# Patient Record
Sex: FEMALE | Race: ASIAN | NOT HISPANIC OR LATINO
[De-identification: names, ages, dates, MRNs, and addresses within clinical notes are randomized per-mention and may not be internally consistent; named-entity substitution may affect disease eponyms.]

---
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
## 2020-10-22 ENCOUNTER — APPOINTMENT (OUTPATIENT)
Dept: DERMATOLOGY | Facility: CLINIC | Age: 20
End: 2020-10-22

## 2021-06-25 ENCOUNTER — LABORATORY RESULT (OUTPATIENT)
Age: 21
End: 2021-06-25

## 2021-06-25 ENCOUNTER — APPOINTMENT (OUTPATIENT)
Dept: CARDIOLOGY | Facility: CLINIC | Age: 21
End: 2021-06-25
Payer: MEDICAID

## 2021-06-25 ENCOUNTER — NON-APPOINTMENT (OUTPATIENT)
Age: 21
End: 2021-06-25

## 2021-06-25 VITALS
TEMPERATURE: 97.8 F | SYSTOLIC BLOOD PRESSURE: 100 MMHG | WEIGHT: 116 LBS | OXYGEN SATURATION: 98 % | HEIGHT: 63.5 IN | DIASTOLIC BLOOD PRESSURE: 60 MMHG | HEART RATE: 90 BPM | BODY MASS INDEX: 20.3 KG/M2

## 2021-06-25 DIAGNOSIS — Z80.7 FAMILY HISTORY OF OTHER MALIGNANT NEOPLASMS OF LYMPHOID, HEMATOPOIETIC AND RELATED TISSUES: ICD-10-CM

## 2021-06-25 DIAGNOSIS — R19.5 OTHER FECAL ABNORMALITIES: ICD-10-CM

## 2021-06-25 DIAGNOSIS — Z84.1 FAMILY HISTORY OF DISORDERS OF KIDNEY AND URETER: ICD-10-CM

## 2021-06-25 PROCEDURE — 93000 ELECTROCARDIOGRAM COMPLETE: CPT

## 2021-06-25 PROCEDURE — 99385 PREV VISIT NEW AGE 18-39: CPT

## 2021-06-25 NOTE — PHYSICAL EXAM
[No Acute Distress] : no acute distress [Well Nourished] : well nourished [Well Developed] : well developed [Well-Appearing] : well-appearing [Normal Sclera/Conjunctiva] : normal sclera/conjunctiva [PERRL] : pupils equal round and reactive to light [EOMI] : extraocular movements intact [Normal Outer Ear/Nose] : the outer ears and nose were normal in appearance [Normal Oropharynx] : the oropharynx was normal [No JVD] : no jugular venous distention [No Lymphadenopathy] : no lymphadenopathy [Supple] : supple [Thyroid Normal, No Nodules] : the thyroid was normal and there were no nodules present [No Respiratory Distress] : no respiratory distress  [No Accessory Muscle Use] : no accessory muscle use [Clear to Auscultation] : lungs were clear to auscultation bilaterally [Normal Rate] : normal rate  [Regular Rhythm] : with a regular rhythm [Normal S1, S2] : normal S1 and S2 [No Murmur] : no murmur heard [No Carotid Bruits] : no carotid bruits [No Abdominal Bruit] : a ~M bruit was not heard ~T in the abdomen [No Varicosities] : no varicosities [Pedal Pulses Present] : the pedal pulses are present [No Edema] : there was no peripheral edema [No Palpable Aorta] : no palpable aorta [No Extremity Clubbing/Cyanosis] : no extremity clubbing/cyanosis [Normal Appearance] : normal in appearance [No Nipple Discharge] : no nipple discharge [Soft] : abdomen soft [No Axillary Lymphadenopathy] : no axillary lymphadenopathy [Non Tender] : non-tender [Non-distended] : non-distended [No Masses] : no abdominal mass palpated [No HSM] : no HSM [Normal Bowel Sounds] : normal bowel sounds [Normal Posterior Cervical Nodes] : no posterior cervical lymphadenopathy [Normal Anterior Cervical Nodes] : no anterior cervical lymphadenopathy [No CVA Tenderness] : no CVA  tenderness [No Spinal Tenderness] : no spinal tenderness [No Joint Swelling] : no joint swelling [Grossly Normal Strength/Tone] : grossly normal strength/tone [No Rash] : no rash [Coordination Grossly Intact] : coordination grossly intact [No Focal Deficits] : no focal deficits [Normal Gait] : normal gait [Deep Tendon Reflexes (DTR)] : deep tendon reflexes were 2+ and symmetric [Normal Affect] : the affect was normal [Normal Insight/Judgement] : insight and judgment were intact

## 2021-06-25 NOTE — HISTORY OF PRESENT ILLNESS
[FreeTextEntry1] : Annual Wellness exam  [de-identified] : This is a 21 year old lady with no significant PMH presents today for annual wellness exam. Patient is accompanied by her younger sister. Patient states that last year she experienced some blood in the stool. Patient had colonoscopy completed last year by Dr. Sawyer and was told that she may have slight proctitis and/or colitis. Patient also states that she has severe "stage fright" and experiences 'panic attacks". Patient states that at the time her heart races and her hands shake and when her throat closes as per patient. Patient states that after the teacher tells her that she does not have to present, her symptoms disappears. Patient states that her last episode was January 2021. Patient denies dyspnea, palpitations, chest pain, nausea, vomiting, dizziness and lightheadedness.\par

## 2021-07-09 ENCOUNTER — NON-APPOINTMENT (OUTPATIENT)
Age: 21
End: 2021-07-09

## 2021-07-09 RX ORDER — ESCITALOPRAM OXALATE 5 MG/1
5 TABLET ORAL
Qty: 30 | Refills: 1 | Status: DISCONTINUED | COMMUNITY
Start: 2021-06-25 | End: 2021-07-09

## 2021-07-12 ENCOUNTER — NON-APPOINTMENT (OUTPATIENT)
Age: 21
End: 2021-07-12

## 2021-07-25 LAB
25(OH)D3 SERPL-MCNC: 21.9 NG/ML
ALBUMIN SERPL ELPH-MCNC: 5 G/DL
ALP BLD-CCNC: 39 U/L
ALT SERPL-CCNC: 11 U/L
ANION GAP SERPL CALC-SCNC: 14 MMOL/L
APPEARANCE: CLEAR
AST SERPL-CCNC: 15 U/L
BACTERIA: NEGATIVE
BASOPHILS # BLD AUTO: 0.06 K/UL
BASOPHILS NFR BLD AUTO: 0.7 %
BILIRUB SERPL-MCNC: 0.6 MG/DL
BILIRUBIN URINE: NEGATIVE
BLOOD URINE: NEGATIVE
BUN SERPL-MCNC: 13 MG/DL
CALCIUM SERPL-MCNC: 10.2 MG/DL
CHLORIDE SERPL-SCNC: 104 MMOL/L
CHOLEST SERPL-MCNC: 151 MG/DL
CO2 SERPL-SCNC: 22 MMOL/L
COLOR: NORMAL
CREAT SERPL-MCNC: 0.78 MG/DL
EOSINOPHIL # BLD AUTO: 0.24 K/UL
EOSINOPHIL NFR BLD AUTO: 2.7 %
ESTIMATED AVERAGE GLUCOSE: 100 MG/DL
FERRITIN SERPL-MCNC: 20 NG/ML
FOLATE SERPL-MCNC: 5.8 NG/ML
GLUCOSE QUALITATIVE U: NEGATIVE
GLUCOSE SERPL-MCNC: 88 MG/DL
HAPTOGLOB SERPL-MCNC: 111 MG/DL
HBA1C MFR BLD HPLC: 5.1 %
HCT VFR BLD CALC: 40.9 %
HDLC SERPL-MCNC: 66 MG/DL
HGB BLD-MCNC: 12.5 G/DL
HYALINE CASTS: 1 /LPF
IMM GRANULOCYTES NFR BLD AUTO: 0.3 %
IRON SERPL-MCNC: 47 UG/DL
KETONES URINE: NEGATIVE
LDH SERPL-CCNC: 144 U/L
LDLC SERPL CALC-MCNC: 74 MG/DL
LEUKOCYTE ESTERASE URINE: NEGATIVE
LYMPHOCYTES # BLD AUTO: 2.8 K/UL
LYMPHOCYTES NFR BLD AUTO: 31.3 %
MAGNESIUM SERPL-MCNC: 2 MG/DL
MAN DIFF?: NORMAL
MCHC RBC-ENTMCNC: 28.8 PG
MCHC RBC-ENTMCNC: 30.6 GM/DL
MCV RBC AUTO: 94.2 FL
MICROSCOPIC-UA: NORMAL
MONOCYTES # BLD AUTO: 0.71 K/UL
MONOCYTES NFR BLD AUTO: 7.9 %
NEUTROPHILS # BLD AUTO: 5.12 K/UL
NEUTROPHILS NFR BLD AUTO: 57.1 %
NITRITE URINE: NEGATIVE
NONHDLC SERPL-MCNC: 85 MG/DL
PH URINE: 6.5
PHOSPHATE SERPL-MCNC: 4.2 MG/DL
PLATELET # BLD AUTO: 388 K/UL
POTASSIUM SERPL-SCNC: 4.4 MMOL/L
PROT SERPL-MCNC: 7.7 G/DL
PROTEIN URINE: NEGATIVE
RBC # BLD: 4.34 M/UL
RBC # FLD: 13.4 %
RED BLOOD CELLS URINE: 1 /HPF
SODIUM SERPL-SCNC: 140 MMOL/L
SPECIFIC GRAVITY URINE: 1.02
SQUAMOUS EPITHELIAL CELLS: 1 /HPF
T3RU NFR SERPL: 1 TBI
T4 FREE SERPL-MCNC: 1.1 NG/DL
T4 SERPL-MCNC: 6.2 UG/DL
TRANSFERRIN SERPL-MCNC: 295 MG/DL
TRIGL SERPL-MCNC: 55 MG/DL
TSH SERPL-ACNC: 1.32 UIU/ML
URATE SERPL-MCNC: 4.6 MG/DL
UROBILINOGEN URINE: NORMAL
VIT B12 SERPL-MCNC: 729 PG/ML
WBC # FLD AUTO: 8.96 K/UL
WHITE BLOOD CELLS URINE: 1 /HPF

## 2021-07-26 ENCOUNTER — NON-APPOINTMENT (OUTPATIENT)
Age: 21
End: 2021-07-26

## 2021-08-11 ENCOUNTER — APPOINTMENT (OUTPATIENT)
Dept: CARDIOLOGY | Facility: CLINIC | Age: 21
End: 2021-08-11

## 2022-06-07 ENCOUNTER — NON-APPOINTMENT (OUTPATIENT)
Age: 22
End: 2022-06-07

## 2022-06-07 ENCOUNTER — APPOINTMENT (OUTPATIENT)
Dept: CARDIOLOGY | Facility: CLINIC | Age: 22
End: 2022-06-07
Payer: MEDICAID

## 2022-06-07 VITALS
OXYGEN SATURATION: 98 % | DIASTOLIC BLOOD PRESSURE: 68 MMHG | TEMPERATURE: 98.4 F | SYSTOLIC BLOOD PRESSURE: 100 MMHG | BODY MASS INDEX: 19.07 KG/M2 | HEIGHT: 63.5 IN | HEART RATE: 88 BPM | WEIGHT: 109 LBS

## 2022-06-07 DIAGNOSIS — R00.2 PALPITATIONS: ICD-10-CM

## 2022-06-07 DIAGNOSIS — Z87.19 PERSONAL HISTORY OF OTHER DISEASES OF THE DIGESTIVE SYSTEM: ICD-10-CM

## 2022-06-07 DIAGNOSIS — R89.9 UNSPECIFIED ABNORMAL FINDING IN SPECIMENS FROM OTHER ORGANS, SYSTEMS AND TISSUES: ICD-10-CM

## 2022-06-07 PROCEDURE — 99214 OFFICE O/P EST MOD 30 MIN: CPT | Mod: 25

## 2022-06-07 PROCEDURE — 93000 ELECTROCARDIOGRAM COMPLETE: CPT

## 2022-06-07 NOTE — REVIEW OF SYSTEMS
[Negative] : Heme/Lymph [Abdominal Pain] : abdominal pain [Anxiety] : anxiety [Palpitations] : palpitations [FreeTextEntry7] : loose stools

## 2022-06-07 NOTE — HISTORY OF PRESENT ILLNESS
[FreeTextEntry1] : Here for annual physical exam.  [de-identified] : This is a 22 year old lady with no significant PMH presents today for annual wellness exam. Patient accompanied by her mother in office today. Patient states that she was seen by a Psychiatrist last year but no longer following for anxiety. Patient requesting refill of Xanax for infrequent anxiety episodes. Patient otherwise doing well. She also continues to follow with GI for hx of colitis/proctitis. On Mesalamine. Patient also stating that she has  been experiencing abdominal pain after eating certain foods such as spicy food. Also reporting frequent bowel movements, states her stools are often loose. Has had 2 colonoscopies in the past year per pt's mother.pt with heart racing with anxiety

## 2022-06-15 ENCOUNTER — NON-APPOINTMENT (OUTPATIENT)
Age: 22
End: 2022-06-15

## 2022-06-15 PROBLEM — R89.9 ABNORMAL LABORATORY TEST: Status: ACTIVE | Noted: 2022-06-15

## 2022-06-15 LAB
25(OH)D3 SERPL-MCNC: 13.8 NG/ML
ALBUMIN SERPL ELPH-MCNC: 4.6 G/DL
ALP BLD-CCNC: 35 U/L
ALT SERPL-CCNC: 9 U/L
ANION GAP SERPL CALC-SCNC: 14 MMOL/L
AST SERPL-CCNC: 18 U/L
BASOPHILS # BLD AUTO: 0.05 K/UL
BASOPHILS NFR BLD AUTO: 0.7 %
BILIRUB SERPL-MCNC: 0.6 MG/DL
BUN SERPL-MCNC: 12 MG/DL
CALCIUM SERPL-MCNC: 9.6 MG/DL
CHLORIDE SERPL-SCNC: 105 MMOL/L
CHOLEST SERPL-MCNC: 130 MG/DL
CO2 SERPL-SCNC: 22 MMOL/L
CREAT SERPL-MCNC: 0.65 MG/DL
CRP SERPL-MCNC: <3 MG/L
EGFR: 128 ML/MIN/1.73M2
ENDOMYSIUM IGA SER QL: NEGATIVE
ENDOMYSIUM IGA TITR SER: NORMAL
EOSINOPHIL # BLD AUTO: 0.2 K/UL
EOSINOPHIL NFR BLD AUTO: 2.7 %
ERYTHROCYTE [SEDIMENTATION RATE] IN BLOOD BY WESTERGREN METHOD: 3 MM/HR
ESTIMATED AVERAGE GLUCOSE: 100 MG/DL
FERRITIN SERPL-MCNC: 12 NG/ML
FOLATE SERPL-MCNC: 9.2 NG/ML
GLIADIN IGA SER QL: <5 UNITS
GLIADIN IGG SER QL: <5 UNITS
GLIADIN PEPTIDE IGA SER-ACNC: NEGATIVE
GLIADIN PEPTIDE IGG SER-ACNC: NEGATIVE
GLUCOSE SERPL-MCNC: 76 MG/DL
HAPTOGLOB SERPL-MCNC: 113 MG/DL
HBA1C MFR BLD HPLC: 5.1 %
HCT VFR BLD CALC: 36.2 %
HDLC SERPL-MCNC: 58 MG/DL
HGB BLD-MCNC: 11.2 G/DL
IGA SER QL IEP: 186 MG/DL
IMM GRANULOCYTES NFR BLD AUTO: 0.3 %
IRON SERPL-MCNC: 28 UG/DL
LDH SERPL-CCNC: 119 U/L
LDLC SERPL CALC-MCNC: 66 MG/DL
LYMPHOCYTES # BLD AUTO: 2.3 K/UL
LYMPHOCYTES NFR BLD AUTO: 31 %
MAN DIFF?: NORMAL
MCHC RBC-ENTMCNC: 28.8 PG
MCHC RBC-ENTMCNC: 30.9 GM/DL
MCV RBC AUTO: 93.1 FL
MONOCYTES # BLD AUTO: 0.72 K/UL
MONOCYTES NFR BLD AUTO: 9.7 %
NEUTROPHILS # BLD AUTO: 4.13 K/UL
NEUTROPHILS NFR BLD AUTO: 55.6 %
NONHDLC SERPL-MCNC: 73 MG/DL
PLATELET # BLD AUTO: 381 K/UL
POTASSIUM SERPL-SCNC: 4.3 MMOL/L
PROT SERPL-MCNC: 7 G/DL
RBC # BLD: 3.89 M/UL
RBC # FLD: 13.8 %
SODIUM SERPL-SCNC: 140 MMOL/L
T4 FREE SERPL-MCNC: 1.3 NG/DL
TRANSFERRIN SERPL-MCNC: 252 MG/DL
TRIGL SERPL-MCNC: 31 MG/DL
TSH SERPL-ACNC: 1.46 UIU/ML
TTG IGA SER IA-ACNC: <1.2 U/ML
TTG IGA SER-ACNC: NEGATIVE
TTG IGG SER IA-ACNC: 4.5 U/ML
TTG IGG SER IA-ACNC: NEGATIVE
VIT B12 SERPL-MCNC: 593 PG/ML
WBC # FLD AUTO: 7.42 K/UL

## 2022-06-15 RX ORDER — ERGOCALCIFEROL 1.25 MG/1
1.25 MG CAPSULE, LIQUID FILLED ORAL
Qty: 6 | Refills: 0 | Status: ACTIVE | COMMUNITY
Start: 2022-06-15 | End: 1900-01-01

## 2022-06-15 RX ORDER — IRON POLYSACCHARIDE COMPLEX 150 MG
150 CAPSULE ORAL
Qty: 90 | Refills: 1 | Status: ACTIVE | COMMUNITY
Start: 2022-06-15 | End: 1900-01-01

## 2022-06-29 ENCOUNTER — NON-APPOINTMENT (OUTPATIENT)
Age: 22
End: 2022-06-29

## 2022-07-12 ENCOUNTER — APPOINTMENT (OUTPATIENT)
Dept: CARDIOLOGY | Facility: CLINIC | Age: 22
End: 2022-07-12

## 2022-07-19 ENCOUNTER — NON-APPOINTMENT (OUTPATIENT)
Age: 22
End: 2022-07-19

## 2022-07-19 ENCOUNTER — APPOINTMENT (OUTPATIENT)
Dept: OPHTHALMOLOGY | Facility: CLINIC | Age: 22
End: 2022-07-19

## 2022-07-19 PROCEDURE — 92004 COMPRE OPH EXAM NEW PT 1/>: CPT

## 2022-07-22 ENCOUNTER — APPOINTMENT (OUTPATIENT)
Dept: OPHTHALMOLOGY | Facility: CLINIC | Age: 22
End: 2022-07-22

## 2023-07-03 ENCOUNTER — APPOINTMENT (OUTPATIENT)
Dept: CARDIOLOGY | Facility: CLINIC | Age: 23
End: 2023-07-03
Payer: MEDICAID

## 2023-07-03 VITALS
TEMPERATURE: 97.7 F | SYSTOLIC BLOOD PRESSURE: 90 MMHG | DIASTOLIC BLOOD PRESSURE: 60 MMHG | HEART RATE: 93 BPM | BODY MASS INDEX: 20.47 KG/M2 | HEIGHT: 63.5 IN | WEIGHT: 117 LBS | OXYGEN SATURATION: 99 %

## 2023-07-03 DIAGNOSIS — L63.9 ALOPECIA AREATA, UNSPECIFIED: ICD-10-CM

## 2023-07-03 DIAGNOSIS — Z13.228 ENCOUNTER FOR SCREENING FOR OTHER METABOLIC DISORDERS: ICD-10-CM

## 2023-07-03 DIAGNOSIS — Z12.39 ENCOUNTER FOR OTHER SCREENING FOR MALIGNANT NEOPLASM OF BREAST: ICD-10-CM

## 2023-07-03 DIAGNOSIS — Z00.00 ENCOUNTER FOR GENERAL ADULT MEDICAL EXAMINATION W/OUT ABNORMAL FINDINGS: ICD-10-CM

## 2023-07-03 DIAGNOSIS — Z71.85 ENCOUNTER FOR IMMUNIZATION SAFETY COUNSELING: ICD-10-CM

## 2023-07-03 DIAGNOSIS — F41.9 ANXIETY DISORDER, UNSPECIFIED: ICD-10-CM

## 2023-07-03 DIAGNOSIS — E61.1 IRON DEFICIENCY: ICD-10-CM

## 2023-07-03 PROCEDURE — 99395 PREV VISIT EST AGE 18-39: CPT

## 2023-07-03 PROCEDURE — 93000 ELECTROCARDIOGRAM COMPLETE: CPT

## 2023-07-03 NOTE — HISTORY OF PRESENT ILLNESS
[de-identified] : This is a 23 year old lady with no significant PMH presents today for annual wellness exam. pt reports alopeicia. follows with derm getsin injections every 4 weeks. pt denies any chest  pain dizziness ,lightheadedness ,nausea vomiting diaphoresis\par

## 2023-07-13 RX ORDER — CHOLECALCIFEROL (VITAMIN D3) 50 MCG
50 MCG TABLET ORAL
Qty: 90 | Refills: 0 | Status: ACTIVE | COMMUNITY
Start: 2023-07-07 | End: 1900-01-01

## 2023-07-13 RX ORDER — ALPRAZOLAM 0.5 MG/1
0.5 TABLET ORAL
Qty: 15 | Refills: 0 | Status: ACTIVE | COMMUNITY
Start: 2021-07-12 | End: 1900-01-01

## 2023-07-13 RX ORDER — ERGOCALCIFEROL 1.25 MG/1
1.25 MG CAPSULE ORAL
Qty: 6 | Refills: 0 | Status: ACTIVE | COMMUNITY
Start: 2023-07-07 | End: 1900-01-01

## 2023-07-17 RX ORDER — NITROFURANTOIN (MONOHYDRATE/MACROCRYSTALS) 25; 75 MG/1; MG/1
100 CAPSULE ORAL
Qty: 14 | Refills: 0 | Status: ACTIVE | COMMUNITY
Start: 2023-07-17 | End: 1900-01-01

## 2023-07-19 ENCOUNTER — NON-APPOINTMENT (OUTPATIENT)
Age: 23
End: 2023-07-19

## 2023-07-31 ENCOUNTER — APPOINTMENT (OUTPATIENT)
Dept: INTERNAL MEDICINE | Facility: CLINIC | Age: 23
End: 2023-07-31
Payer: MEDICAID

## 2023-07-31 VITALS
SYSTOLIC BLOOD PRESSURE: 90 MMHG | HEART RATE: 78 BPM | DIASTOLIC BLOOD PRESSURE: 60 MMHG | BODY MASS INDEX: 20.47 KG/M2 | TEMPERATURE: 98.7 F | WEIGHT: 117 LBS | OXYGEN SATURATION: 97 % | HEIGHT: 63.5 IN

## 2023-07-31 VITALS — DIASTOLIC BLOOD PRESSURE: 64 MMHG | SYSTOLIC BLOOD PRESSURE: 100 MMHG

## 2023-07-31 DIAGNOSIS — K51.80 OTHER ULCERATIVE COLITIS W/OUT COMPLICATIONS: ICD-10-CM

## 2023-07-31 DIAGNOSIS — G89.29 LOW BACK PAIN, UNSPECIFIED: ICD-10-CM

## 2023-07-31 DIAGNOSIS — R76.8 OTHER SPECIFIED ABNORMAL IMMUNOLOGICAL FINDINGS IN SERUM: ICD-10-CM

## 2023-07-31 DIAGNOSIS — M54.50 LOW BACK PAIN, UNSPECIFIED: ICD-10-CM

## 2023-07-31 DIAGNOSIS — E55.9 VITAMIN D DEFICIENCY, UNSPECIFIED: ICD-10-CM

## 2023-07-31 PROCEDURE — 99214 OFFICE O/P EST MOD 30 MIN: CPT

## 2023-07-31 NOTE — HISTORY OF PRESENT ILLNESS
[FreeTextEntry8] : This is a 23 year old lady with PMHx of ulcerative colitis and proctitis presents today for c/o chronic achy lower back pain that she has had for years, that went away, and has recently come back a few weeks ago and she is always twisting and turning because of the discomfort which makes it go away. Denies dysuria, urinary frequency, urgency, hematuria. Denies LE weakness, urinary/bowel incontinence, saddle anesthesia.  Denies chest pain, SOB, SOLIZ, dizziness, diaphoresis, palpitations, LE swelling, orthopnea, syncope, n/v, headache.

## 2023-07-31 NOTE — HEALTH RISK ASSESSMENT
[0] : 2) Feeling down, depressed, or hopeless: Not at all (0) [PHQ-2 Negative - No further assessment needed] : PHQ-2 Negative - No further assessment needed [Never] : Never [PFA1Grunm] : 0

## 2023-07-31 NOTE — REVIEW OF SYSTEMS
[Back Pain] : back pain [Dysuria] : no dysuria [Hematuria] : no hematuria [Frequency] : no frequency [Negative] : Genitourinary

## 2023-08-02 ENCOUNTER — APPOINTMENT (OUTPATIENT)
Dept: UROLOGY | Facility: CLINIC | Age: 23
End: 2023-08-02
Payer: MEDICAID

## 2023-08-02 VITALS
HEIGHT: 63.5 IN | DIASTOLIC BLOOD PRESSURE: 76 MMHG | WEIGHT: 117 LBS | TEMPERATURE: 98.5 F | HEART RATE: 87 BPM | SYSTOLIC BLOOD PRESSURE: 108 MMHG | OXYGEN SATURATION: 100 % | BODY MASS INDEX: 20.47 KG/M2

## 2023-08-02 DIAGNOSIS — R82.90 UNSPECIFIED ABNORMAL FINDINGS IN URINE: ICD-10-CM

## 2023-08-02 DIAGNOSIS — R35.0 FREQUENCY OF MICTURITION: ICD-10-CM

## 2023-08-02 PROCEDURE — 99204 OFFICE O/P NEW MOD 45 MIN: CPT

## 2023-08-02 NOTE — ASSESSMENT
[FreeTextEntry1] : prior labs reviewed minimal post void  repeat ua cx g/c  increase hydration reduce caffeine f/u gyn

## 2023-08-02 NOTE — PHYSICAL EXAM
[General Appearance - Well Developed] : well developed [General Appearance - Well Nourished] : well nourished [Normal Appearance] : normal appearance [Well Groomed] : well groomed [General Appearance - In No Acute Distress] : no acute distress [Edema] : no peripheral edema [] : no respiratory distress [Abdomen Soft] : soft [Abdomen Tenderness] : non-tender [Urinary Bladder Findings] : the bladder was normal on palpation [Normal Station and Gait] : the gait and station were normal for the patient's age

## 2023-08-02 NOTE — HISTORY OF PRESENT ILLNESS
[FreeTextEntry1] : cc frequent urination  24 yo fem had h/o urinary frequency last month which now resolved but has been having new nocturia x1  no dysuria  ua initially pos but no cx - did not take abx but reepat ua cx neg 2 weeks later  has some pulling sensation in pelvis around menses  h/o uc - no recent flare - nl bm  sexually active 1 partner

## 2023-08-07 LAB
APPEARANCE: CLEAR
BACTERIA UR CULT: NORMAL
BACTERIA: ABNORMAL /HPF
BILIRUBIN URINE: NEGATIVE
BLOOD URINE: NEGATIVE
C TRACH RRNA SPEC QL NAA+PROBE: NOT DETECTED
CAST: 0 /LPF
COLOR: YELLOW
EPITHELIAL CELLS: 2 /HPF
GLUCOSE QUALITATIVE U: NEGATIVE MG/DL
KETONES URINE: NEGATIVE MG/DL
LEUKOCYTE ESTERASE URINE: NEGATIVE
MICROSCOPIC-UA: NORMAL
N GONORRHOEA RRNA SPEC QL NAA+PROBE: NOT DETECTED
NITRITE URINE: NEGATIVE
PH URINE: 6
PROTEIN URINE: NEGATIVE MG/DL
RED BLOOD CELLS URINE: 1 /HPF
SOURCE AMPLIFICATION: NORMAL
SPECIFIC GRAVITY URINE: 1.02
UROBILINOGEN URINE: 0.2 MG/DL
WHITE BLOOD CELLS URINE: 0 /HPF

## 2023-08-09 ENCOUNTER — APPOINTMENT (OUTPATIENT)
Dept: OBGYN | Facility: CLINIC | Age: 23
End: 2023-08-09

## 2023-08-12 ENCOUNTER — NON-APPOINTMENT (OUTPATIENT)
Age: 23
End: 2023-08-12

## 2023-08-14 ENCOUNTER — APPOINTMENT (OUTPATIENT)
Dept: INTERNAL MEDICINE | Facility: CLINIC | Age: 23
End: 2023-08-14
Payer: MEDICAID

## 2023-08-14 VITALS
HEART RATE: 97 BPM | OXYGEN SATURATION: 99 % | HEIGHT: 63.5 IN | BODY MASS INDEX: 20.3 KG/M2 | DIASTOLIC BLOOD PRESSURE: 64 MMHG | WEIGHT: 116 LBS | SYSTOLIC BLOOD PRESSURE: 90 MMHG | TEMPERATURE: 98.8 F

## 2023-08-14 DIAGNOSIS — R20.2 PARESTHESIA OF SKIN: ICD-10-CM

## 2023-08-14 PROCEDURE — 99203 OFFICE O/P NEW LOW 30 MIN: CPT | Mod: 25

## 2023-08-14 PROCEDURE — 93000 ELECTROCARDIOGRAM COMPLETE: CPT

## 2023-08-14 NOTE — PHYSICAL EXAM
[No Acute Distress] : no acute distress [Normal Sclera/Conjunctiva] : normal sclera/conjunctiva [Normal Outer Ear/Nose] : the outer ears and nose were normal in appearance [No JVD] : no jugular venous distention [No Respiratory Distress] : no respiratory distress  [No Accessory Muscle Use] : no accessory muscle use [Clear to Auscultation] : lungs were clear to auscultation bilaterally [Normal Rate] : normal rate  [Regular Rhythm] : with a regular rhythm [Normal S1, S2] : normal S1 and S2 [No Edema] : there was no peripheral edema [Soft] : abdomen soft [No CVA Tenderness] : no CVA  tenderness [No Spinal Tenderness] : no spinal tenderness [No Rash] : no rash

## 2023-08-15 LAB
ANION GAP SERPL CALC-SCNC: 12 MMOL/L
BUN SERPL-MCNC: 10 MG/DL
CALCIUM SERPL-MCNC: 10.2 MG/DL
CHLORIDE SERPL-SCNC: 101 MMOL/L
CO2 SERPL-SCNC: 24 MMOL/L
CREAT SERPL-MCNC: 0.65 MG/DL
EGFR: 127 ML/MIN/1.73M2
ESTIMATED AVERAGE GLUCOSE: 100 MG/DL
GLUCOSE SERPL-MCNC: 82 MG/DL
HBA1C MFR BLD HPLC: 5.1 %
HBV SURFACE AB SER QL: NONREACTIVE
HBV SURFACE AG SER QL: NONREACTIVE
MEV IGG FLD QL IA: >300 AU/ML
MEV IGG+IGM SER-IMP: POSITIVE
MUV AB SER-ACNC: POSITIVE
MUV IGG SER QL IA: 84.1 AU/ML
POTASSIUM SERPL-SCNC: 4.1 MMOL/L
RUBV IGG FLD-ACNC: 12 INDEX
RUBV IGG SER-IMP: POSITIVE
SODIUM SERPL-SCNC: 138 MMOL/L
TSH SERPL-ACNC: 1.07 UIU/ML
VIT B12 SERPL-MCNC: 619 PG/ML
VZV AB TITR SER: NEGATIVE
VZV IGG SER IF-ACNC: 122 INDEX

## 2023-08-16 LAB
M TB IFN-G BLD-IMP: NEGATIVE
QUANTIFERON TB PLUS MITOGEN MINUS NIL: >10 IU/ML
QUANTIFERON TB PLUS NIL: 0.06 IU/ML
QUANTIFERON TB PLUS TB1 MINUS NIL: -0.02 IU/ML
QUANTIFERON TB PLUS TB2 MINUS NIL: -0.03 IU/ML

## 2023-08-23 NOTE — PLAN
[FreeTextEntry1] : Paresthesias in right lower extremity - refer to neurology, check labs Administrative encounter -pt needs health clearance prior to starting medical program, will check titers and f/u

## 2023-08-23 NOTE — REVIEW OF SYSTEMS
[Back Pain] : back pain [Fever] : no fever [Night Sweats] : no night sweats [Discharge] : no discharge [Vision Problems] : no vision problems [Earache] : no earache [Nasal Discharge] : no nasal discharge [Chest Pain] : no chest pain [Palpitations] : no palpitations [Shortness Of Breath] : no shortness of breath [Wheezing] : no wheezing [Abdominal Pain] : no abdominal pain [Vomiting] : no vomiting [Dysuria] : no dysuria [Hematuria] : no hematuria [Joint Pain] : no joint pain [Muscle Pain] : no muscle pain [Itching] : no itching [Skin Rash] : no skin rash [Headache] : no headache [Memory Loss] : no memory loss [Insomnia] : no insomnia [Easy Bleeding] : no easy bleeding [Easy Bruising] : no easy bruising

## 2023-08-23 NOTE — HISTORY OF PRESENT ILLNESS
[FreeTextEntry8] : 23 year old female with PMHx of ulcerative colitis who presents with pins/needles in right foot.  She noticed this last wednesday, she had stomach pain, was hunched over with knees bent to stomach.  She thought it might've been related to her menses but that has resolved and pins/needles still persist.  She denies any trauma or falls or inciting event.  The sensation comes and goes.  Currently her foot feels heavy, denies any pins/needles.

## 2023-08-28 ENCOUNTER — APPOINTMENT (OUTPATIENT)
Dept: INTERNAL MEDICINE | Facility: CLINIC | Age: 23
End: 2023-08-28

## 2023-09-28 ENCOUNTER — APPOINTMENT (OUTPATIENT)
Dept: RHEUMATOLOGY | Facility: CLINIC | Age: 23
End: 2023-09-28

## 2024-03-25 LAB
25(OH)D3 SERPL-MCNC: 18.3 NG/ML
ACTH SER-ACNC: 27.6 PG/ML
ALBUMIN SERPL ELPH-MCNC: 4.5 G/DL
ALP BLD-CCNC: 37 U/L
ALT SERPL-CCNC: 20 U/L
ANA PAT FLD IF-IMP: ABNORMAL
ANA SER IF-ACNC: ABNORMAL
ANION GAP SERPL CALC-SCNC: 14 MMOL/L
APPEARANCE: ABNORMAL
APPEARANCE: CLEAR
AST SERPL-CCNC: 19 U/L
BACTERIA UR CULT: NORMAL
BACTERIA: ABNORMAL /HPF
BACTERIA: NEGATIVE /HPF
BILIRUB DIRECT SERPL-MCNC: 0.2 MG/DL
BILIRUB INDIRECT SERPL-MCNC: 0.7 MG/DL
BILIRUB SERPL-MCNC: 1 MG/DL
BILIRUBIN URINE: NEGATIVE
BILIRUBIN URINE: NEGATIVE
BLOOD URINE: ABNORMAL
BLOOD URINE: NEGATIVE
BUN SERPL-MCNC: 10 MG/DL
CALCIUM SERPL-MCNC: 9.6 MG/DL
CAST: 0 /LPF
CAST: 4 /LPF
CCP AB SER IA-ACNC: <8 UNITS
CHLORIDE SERPL-SCNC: 104 MMOL/L
CHOLEST SERPL-MCNC: 146 MG/DL
CO2 SERPL-SCNC: 22 MMOL/L
COLOR: YELLOW
COLOR: YELLOW
CORTICOSTEROID BIND GLOBULIN: 1.5 MG/DL
CORTIS SERPL-MCNC: 6.3 UG/DL
CORTISOL, FREE: 0.47 UG/DL
CREAT SERPL-MCNC: 0.68 MG/DL
CRP SERPL-MCNC: <3 MG/L
DSDNA AB SER-ACNC: <12 IU/ML
EGFR: 125 ML/MIN/1.73M2
EPITHELIAL CELLS: 1 /HPF
EPITHELIAL CELLS: 4 /HPF
ERYTHROCYTE [SEDIMENTATION RATE] IN BLOOD BY WESTERGREN METHOD: 5 MM/HR
ESTIMATED AVERAGE GLUCOSE: 100 MG/DL
ESTRADIOL SERPL-MCNC: 146 PG/ML
FERRITIN SERPL-MCNC: 16 NG/ML
FSH SERPL-MCNC: 3.4 IU/L
GLUCOSE QUALITATIVE U: NEGATIVE MG/DL
GLUCOSE QUALITATIVE U: NEGATIVE MG/DL
GLUCOSE SERPL-MCNC: 69 MG/DL
HBA1C MFR BLD HPLC: 5.1 %
HDLC SERPL-MCNC: 61 MG/DL
IRON SATN MFR SERPL: 32 %
IRON SERPL-MCNC: 108 UG/DL
KETONES URINE: ABNORMAL MG/DL
KETONES URINE: NEGATIVE MG/DL
LDLC SERPL CALC-MCNC: 76 MG/DL
LEUKOCYTE ESTERASE URINE: ABNORMAL
LEUKOCYTE ESTERASE URINE: NEGATIVE
LH SERPL-ACNC: 7.8 IU/L
MAGNESIUM SERPL-MCNC: 1.9 MG/DL
MICROSCOPIC-UA: NORMAL
MICROSCOPIC-UA: NORMAL
NITRITE URINE: NEGATIVE
NITRITE URINE: NEGATIVE
NONHDLC SERPL-MCNC: 85 MG/DL
PFCX: 7.5 %
PH URINE: 5.5
PH URINE: 5.5
POTASSIUM SERPL-SCNC: 3.5 MMOL/L
PROGEST SERPL-MCNC: 9 NG/ML
PROLACTIN SERPL-MCNC: 11.5 NG/ML
PROT SERPL-MCNC: 7.6 G/DL
PROTEIN URINE: NEGATIVE MG/DL
PROTEIN URINE: NORMAL MG/DL
RED BLOOD CELLS URINE: 1 /HPF
RED BLOOD CELLS URINE: 6 /HPF
REVIEW: NORMAL
RF+CCP IGG SER-IMP: NEGATIVE
RHEUMATOID FACT SER QL: <10 IU/ML
SODIUM SERPL-SCNC: 140 MMOL/L
SPECIFIC GRAVITY URINE: 1.02
SPECIFIC GRAVITY URINE: 1.03
T4 FREE SERPL-MCNC: 1.1 NG/DL
TIBC SERPL-MCNC: 340 UG/DL
TRANSFERRIN SERPL-MCNC: 255 MG/DL
TRIGL SERPL-MCNC: 44 MG/DL
TSH SERPL-ACNC: 1.45 UIU/ML
UIBC SERPL-MCNC: 232 UG/DL
UROBILINOGEN URINE: 0.2 MG/DL
UROBILINOGEN URINE: 0.2 MG/DL
WHITE BLOOD CELLS URINE: 0 /HPF
WHITE BLOOD CELLS URINE: 21 /HPF

## 2024-07-05 ENCOUNTER — APPOINTMENT (OUTPATIENT)
Dept: CARDIOLOGY | Facility: CLINIC | Age: 24
End: 2024-07-05

## 2024-08-26 ENCOUNTER — APPOINTMENT (OUTPATIENT)
Dept: INTERNAL MEDICINE | Facility: CLINIC | Age: 24
End: 2024-08-26
Payer: MEDICAID

## 2024-08-26 ENCOUNTER — TRANSCRIPTION ENCOUNTER (OUTPATIENT)
Age: 24
End: 2024-08-26

## 2024-08-26 VITALS
SYSTOLIC BLOOD PRESSURE: 110 MMHG | WEIGHT: 123 LBS | BODY MASS INDEX: 21.52 KG/M2 | OXYGEN SATURATION: 97 % | DIASTOLIC BLOOD PRESSURE: 76 MMHG | HEIGHT: 63.5 IN | HEART RATE: 76 BPM | TEMPERATURE: 98.4 F

## 2024-08-26 DIAGNOSIS — K51.90 ULCERATIVE COLITIS, UNSPECIFIED, W/OUT COMPLICATIONS: ICD-10-CM

## 2024-08-26 DIAGNOSIS — Z23 ENCOUNTER FOR IMMUNIZATION: ICD-10-CM

## 2024-08-26 DIAGNOSIS — Z00.00 ENCOUNTER FOR GENERAL ADULT MEDICAL EXAMINATION W/OUT ABNORMAL FINDINGS: ICD-10-CM

## 2024-08-26 DIAGNOSIS — Z13.228 ENCOUNTER FOR SCREENING FOR OTHER METABOLIC DISORDERS: ICD-10-CM

## 2024-08-26 PROCEDURE — 99214 OFFICE O/P EST MOD 30 MIN: CPT | Mod: 25

## 2024-08-26 PROCEDURE — 93000 ELECTROCARDIOGRAM COMPLETE: CPT

## 2024-08-26 PROCEDURE — G2211 COMPLEX E/M VISIT ADD ON: CPT | Mod: NC

## 2024-08-26 PROCEDURE — G0008: CPT

## 2024-08-26 PROCEDURE — 90686 IIV4 VACC NO PRSV 0.5 ML IM: CPT

## 2024-08-27 LAB
25(OH)D3 SERPL-MCNC: 16.2 NG/ML
ALBUMIN SERPL ELPH-MCNC: 4.5 G/DL
ALP BLD-CCNC: 36 U/L
ALT SERPL-CCNC: 11 U/L
ANION GAP SERPL CALC-SCNC: 10 MMOL/L
AST SERPL-CCNC: 17 U/L
BILIRUB DIRECT SERPL-MCNC: 0.2 MG/DL
BILIRUB INDIRECT SERPL-MCNC: 0.6 MG/DL
BILIRUB SERPL-MCNC: 0.9 MG/DL
BUN SERPL-MCNC: 11 MG/DL
CALCIUM SERPL-MCNC: 9.7 MG/DL
CHLORIDE SERPL-SCNC: 104 MMOL/L
CHOLEST SERPL-MCNC: 169 MG/DL
CO2 SERPL-SCNC: 23 MMOL/L
CREAT SERPL-MCNC: 0.67 MG/DL
EGFR: 125 ML/MIN/1.73M2
ESTIMATED AVERAGE GLUCOSE: 103 MG/DL
GLUCOSE SERPL-MCNC: 82 MG/DL
HBA1C MFR BLD HPLC: 5.2 %
HCT VFR BLD CALC: 39.4 %
HDLC SERPL-MCNC: 68 MG/DL
HGB BLD-MCNC: 12.1 G/DL
LDLC SERPL CALC-MCNC: 91 MG/DL
MCHC RBC-ENTMCNC: 28.5 PG
MCHC RBC-ENTMCNC: 30.7 GM/DL
MCV RBC AUTO: 92.7 FL
NONHDLC SERPL-MCNC: 101 MG/DL
PLATELET # BLD AUTO: 338 K/UL
POTASSIUM SERPL-SCNC: 4.6 MMOL/L
PROT SERPL-MCNC: 7.2 G/DL
RBC # BLD: 4.25 M/UL
RBC # FLD: 13.9 %
SODIUM SERPL-SCNC: 137 MMOL/L
TRIGL SERPL-MCNC: 49 MG/DL
TSH SERPL-ACNC: 1.74 UIU/ML
VIT B12 SERPL-MCNC: 580 PG/ML
WBC # FLD AUTO: 7.5 K/UL

## 2024-08-27 NOTE — HEALTH RISK ASSESSMENT
[No] : No [No falls in past year] : Patient reported no falls in the past year [0] : 2) Feeling down, depressed, or hopeless: Not at all (0) [PHQ-2 Negative - No further assessment needed] : PHQ-2 Negative - No further assessment needed [Never] : Never [[id number]] : 0

## 2024-08-27 NOTE — HEALTH RISK ASSESSMENT
[No] : No [No falls in past year] : Patient reported no falls in the past year [0] : 2) Feeling down, depressed, or hopeless: Not at all (0) [PHQ-2 Negative - No further assessment needed] : PHQ-2 Negative - No further assessment needed [Never] : Never [[id number]] : 0

## 2024-08-27 NOTE — HISTORY OF PRESENT ILLNESS
[FreeTextEntry1] : follow-up, school form, ulcerative colitis [de-identified] : 24 year old female presents for routine visit. Currently she has no acute medical complaints. She has forms to complete for school which requires quantiferon.  She denies any chest pain, shortness of breath or cough.

## 2024-08-27 NOTE — PLAN
[FreeTextEntry1] : Ulcerative colitis - c/w mesalamine, f/u with GI Pre-employment exam - will obtain quantiferon gold HMT - advised GYN evaluation, will obtain screening labs

## 2024-08-27 NOTE — HISTORY OF PRESENT ILLNESS
[FreeTextEntry1] : follow-up, school form, ulcerative colitis [de-identified] : 24 year old female presents for routine visit. Currently she has no acute medical complaints. She has forms to complete for school which requires quantiferon.  She denies any chest pain, shortness of breath or cough.

## 2024-08-29 LAB
M TB IFN-G BLD-IMP: NEGATIVE
QUANTIFERON TB PLUS MITOGEN MINUS NIL: >10 IU/ML
QUANTIFERON TB PLUS NIL: 0.06 IU/ML
QUANTIFERON TB PLUS TB1 MINUS NIL: 0 IU/ML
QUANTIFERON TB PLUS TB2 MINUS NIL: 0 IU/ML

## 2025-03-03 ENCOUNTER — APPOINTMENT (OUTPATIENT)
Dept: INTERNAL MEDICINE | Facility: CLINIC | Age: 25
End: 2025-03-03

## 2025-03-13 ENCOUNTER — APPOINTMENT (OUTPATIENT)
Dept: INTERNAL MEDICINE | Facility: CLINIC | Age: 25
End: 2025-03-13
Payer: MEDICAID

## 2025-03-13 VITALS
HEIGHT: 63.5 IN | HEART RATE: 98 BPM | DIASTOLIC BLOOD PRESSURE: 80 MMHG | WEIGHT: 116 LBS | SYSTOLIC BLOOD PRESSURE: 110 MMHG | OXYGEN SATURATION: 99 % | BODY MASS INDEX: 20.3 KG/M2 | TEMPERATURE: 98.4 F

## 2025-03-13 DIAGNOSIS — E55.9 VITAMIN D DEFICIENCY, UNSPECIFIED: ICD-10-CM

## 2025-03-13 DIAGNOSIS — K51.90 ULCERATIVE COLITIS, UNSPECIFIED, W/OUT COMPLICATIONS: ICD-10-CM

## 2025-03-13 DIAGNOSIS — Z13.228 ENCOUNTER FOR SCREENING FOR OTHER METABOLIC DISORDERS: ICD-10-CM

## 2025-03-13 PROCEDURE — 99395 PREV VISIT EST AGE 18-39: CPT

## 2025-03-14 LAB
25(OH)D3 SERPL-MCNC: 31.4 NG/ML
ALBUMIN SERPL ELPH-MCNC: 4.4 G/DL
ALP BLD-CCNC: 46 U/L
ALT SERPL-CCNC: 44 U/L
ANION GAP SERPL CALC-SCNC: 14 MMOL/L
APPEARANCE: CLEAR
AST SERPL-CCNC: 33 U/L
BASOPHILS # BLD AUTO: 0.04 K/UL
BASOPHILS NFR BLD AUTO: 0.5 %
BILIRUB DIRECT SERPL-MCNC: 0.3 MG/DL
BILIRUB INDIRECT SERPL-MCNC: 0.8 MG/DL
BILIRUB SERPL-MCNC: 1 MG/DL
BILIRUBIN URINE: NEGATIVE
BLOOD URINE: NEGATIVE
BUN SERPL-MCNC: 11 MG/DL
CALCIUM SERPL-MCNC: 9.5 MG/DL
CHLORIDE SERPL-SCNC: 103 MMOL/L
CHOLEST SERPL-MCNC: 155 MG/DL
CO2 SERPL-SCNC: 24 MMOL/L
COLOR: YELLOW
CREAT SERPL-MCNC: 0.63 MG/DL
EGFRCR SERPLBLD CKD-EPI 2021: 127 ML/MIN/1.73M2
EOSINOPHIL # BLD AUTO: 0.11 K/UL
EOSINOPHIL NFR BLD AUTO: 1.5 %
ESTIMATED AVERAGE GLUCOSE: 100 MG/DL
GLUCOSE QUALITATIVE U: NEGATIVE MG/DL
GLUCOSE SERPL-MCNC: 106 MG/DL
HBA1C MFR BLD HPLC: 5.1 %
HCT VFR BLD CALC: 38.3 %
HDLC SERPL-MCNC: 68 MG/DL
HGB BLD-MCNC: 11.9 G/DL
IMM GRANULOCYTES NFR BLD AUTO: 0.3 %
KETONES URINE: NEGATIVE MG/DL
LDLC SERPL CALC-MCNC: 79 MG/DL
LEUKOCYTE ESTERASE URINE: NEGATIVE
LYMPHOCYTES # BLD AUTO: 2.78 K/UL
LYMPHOCYTES NFR BLD AUTO: 37 %
MAGNESIUM SERPL-MCNC: 1.9 MG/DL
MAN DIFF?: NORMAL
MCHC RBC-ENTMCNC: 28.7 PG
MCHC RBC-ENTMCNC: 31.1 G/DL
MCV RBC AUTO: 92.3 FL
MONOCYTES # BLD AUTO: 0.6 K/UL
MONOCYTES NFR BLD AUTO: 8 %
NEUTROPHILS # BLD AUTO: 3.96 K/UL
NEUTROPHILS NFR BLD AUTO: 52.7 %
NITRITE URINE: NEGATIVE
NONHDLC SERPL-MCNC: 87 MG/DL
PH URINE: 5.5
PLATELET # BLD AUTO: 335 K/UL
POTASSIUM SERPL-SCNC: 3.8 MMOL/L
PROT SERPL-MCNC: 7.5 G/DL
PROTEIN URINE: NEGATIVE MG/DL
RBC # BLD: 4.15 M/UL
RBC # FLD: 13.6 %
SODIUM SERPL-SCNC: 140 MMOL/L
SPECIFIC GRAVITY URINE: 1.02
T4 FREE SERPL-MCNC: 1.2 NG/DL
TRIGL SERPL-MCNC: 33 MG/DL
TSH SERPL-ACNC: 1.31 UIU/ML
UROBILINOGEN URINE: 0.2 MG/DL
VIT B12 SERPL-MCNC: 602 PG/ML
WBC # FLD AUTO: 7.51 K/UL

## 2025-03-17 ENCOUNTER — TRANSCRIPTION ENCOUNTER (OUTPATIENT)
Age: 25
End: 2025-03-17